# Patient Record
Sex: FEMALE | Race: WHITE | NOT HISPANIC OR LATINO | Employment: FULL TIME | URBAN - NONMETROPOLITAN AREA
[De-identification: names, ages, dates, MRNs, and addresses within clinical notes are randomized per-mention and may not be internally consistent; named-entity substitution may affect disease eponyms.]

---

## 2022-10-05 ENCOUNTER — HOSPITAL ENCOUNTER (EMERGENCY)
Facility: HOSPITAL | Age: 37
Discharge: HOME | End: 2022-10-05
Attending: EMERGENCY MEDICINE
Payer: COMMERCIAL

## 2022-10-05 VITALS
BODY MASS INDEX: 34.73 KG/M2 | DIASTOLIC BLOOD PRESSURE: 78 MMHG | RESPIRATION RATE: 19 BRPM | HEIGHT: 63 IN | OXYGEN SATURATION: 95 % | WEIGHT: 196 LBS | HEART RATE: 112 BPM | TEMPERATURE: 97.6 F | SYSTOLIC BLOOD PRESSURE: 115 MMHG

## 2022-10-05 DIAGNOSIS — L50.9 URTICARIA: Primary | ICD-10-CM

## 2022-10-05 LAB
ALBUMIN SERPL BCP-MCNC: 3.3 G/DL (ref 3.5–5)
ALP SERPL-CCNC: 73 U/L (ref 46–116)
ALT SERPL W P-5'-P-CCNC: 24 U/L (ref 12–78)
ANION GAP SERPL CALCULATED.3IONS-SCNC: 10 MMOL/L (ref 4–13)
AST SERPL W P-5'-P-CCNC: 45 U/L (ref 5–45)
BASOPHILS # BLD AUTO: 0.01 THOUSANDS/ΜL (ref 0–0.1)
BASOPHILS NFR BLD AUTO: 0 % (ref 0–1)
BILIRUB SERPL-MCNC: 0.59 MG/DL (ref 0.2–1)
BUN SERPL-MCNC: 20 MG/DL (ref 5–25)
CALCIUM ALBUM COR SERPL-MCNC: 9.1 MG/DL (ref 8.3–10.1)
CALCIUM SERPL-MCNC: 8.5 MG/DL (ref 8.3–10.1)
CHLORIDE SERPL-SCNC: 101 MMOL/L (ref 96–108)
CO2 SERPL-SCNC: 21 MMOL/L (ref 21–32)
CREAT SERPL-MCNC: 0.75 MG/DL (ref 0.6–1.3)
EOSINOPHIL # BLD AUTO: 0.09 THOUSAND/ΜL (ref 0–0.61)
EOSINOPHIL NFR BLD AUTO: 1 % (ref 0–6)
ERYTHROCYTE [DISTWIDTH] IN BLOOD BY AUTOMATED COUNT: 11.7 % (ref 11.6–15.1)
GFR SERPL CREATININE-BSD FRML MDRD: 102 ML/MIN/1.73SQ M
GLUCOSE SERPL-MCNC: 107 MG/DL (ref 65–140)
HCT VFR BLD AUTO: 37.1 % (ref 34.8–46.1)
HGB BLD-MCNC: 12.9 G/DL (ref 11.5–15.4)
IMM GRANULOCYTES # BLD AUTO: 0.03 THOUSAND/UL (ref 0–0.2)
IMM GRANULOCYTES NFR BLD AUTO: 0 % (ref 0–2)
LYMPHOCYTES # BLD AUTO: 1.91 THOUSANDS/ΜL (ref 0.6–4.47)
LYMPHOCYTES NFR BLD AUTO: 25 % (ref 14–44)
MCH RBC QN AUTO: 32.3 PG (ref 26.8–34.3)
MCHC RBC AUTO-ENTMCNC: 34.8 G/DL (ref 31.4–37.4)
MCV RBC AUTO: 93 FL (ref 82–98)
MONOCYTES # BLD AUTO: 0.14 THOUSAND/ΜL (ref 0.17–1.22)
MONOCYTES NFR BLD AUTO: 2 % (ref 4–12)
NEUTROPHILS # BLD AUTO: 5.56 THOUSANDS/ΜL (ref 1.85–7.62)
NEUTS SEG NFR BLD AUTO: 72 % (ref 43–75)
NRBC BLD AUTO-RTO: 0 /100 WBCS
PLATELET # BLD AUTO: 305 THOUSANDS/UL (ref 149–390)
PMV BLD AUTO: 10.3 FL (ref 8.9–12.7)
POTASSIUM SERPL-SCNC: 4.9 MMOL/L (ref 3.5–5.3)
PROT SERPL-MCNC: 7.4 G/DL (ref 6.4–8.4)
RBC # BLD AUTO: 3.99 MILLION/UL (ref 3.81–5.12)
SODIUM SERPL-SCNC: 132 MMOL/L (ref 135–147)
WBC # BLD AUTO: 7.74 THOUSAND/UL (ref 4.31–10.16)

## 2022-10-05 PROCEDURE — 80053 COMPREHEN METABOLIC PANEL: CPT | Performed by: EMERGENCY MEDICINE

## 2022-10-05 PROCEDURE — 36415 COLL VENOUS BLD VENIPUNCTURE: CPT | Performed by: EMERGENCY MEDICINE

## 2022-10-05 PROCEDURE — 85025 COMPLETE CBC W/AUTO DIFF WBC: CPT | Performed by: EMERGENCY MEDICINE

## 2022-10-05 PROCEDURE — 96374 THER/PROPH/DIAG INJ IV PUSH: CPT

## 2022-10-05 PROCEDURE — 96375 TX/PRO/DX INJ NEW DRUG ADDON: CPT

## 2022-10-05 PROCEDURE — 99284 EMERGENCY DEPT VISIT MOD MDM: CPT | Performed by: EMERGENCY MEDICINE

## 2022-10-05 PROCEDURE — 99283 EMERGENCY DEPT VISIT LOW MDM: CPT

## 2022-10-05 RX ORDER — DULOXETIN HYDROCHLORIDE 60 MG/1
CAPSULE, DELAYED RELEASE ORAL
COMMUNITY

## 2022-10-05 RX ORDER — DIPHENHYDRAMINE HYDROCHLORIDE 50 MG/ML
25 INJECTION INTRAMUSCULAR; INTRAVENOUS ONCE
Status: COMPLETED | OUTPATIENT
Start: 2022-10-05 | End: 2022-10-05

## 2022-10-05 RX ORDER — OMEGA-3S/DHA/EPA/FISH OIL/D3 300MG-1000
1 CAPSULE ORAL DAILY
COMMUNITY

## 2022-10-05 RX ORDER — METHYLPREDNISOLONE SODIUM SUCCINATE 125 MG/2ML
125 INJECTION, POWDER, LYOPHILIZED, FOR SOLUTION INTRAMUSCULAR; INTRAVENOUS ONCE
Status: COMPLETED | OUTPATIENT
Start: 2022-10-05 | End: 2022-10-05

## 2022-10-05 RX ORDER — PREDNISONE 20 MG/1
20 TABLET ORAL DAILY
Qty: 5 TABLET | Refills: 0 | Status: SHIPPED | OUTPATIENT
Start: 2022-10-06

## 2022-10-05 RX ORDER — TOPIRAMATE 25 MG/1
TABLET ORAL
COMMUNITY

## 2022-10-05 RX ORDER — CYCLOBENZAPRINE HCL 10 MG
TABLET ORAL
COMMUNITY

## 2022-10-05 RX ORDER — ARIPIPRAZOLE 15 MG/1
15 TABLET ORAL DAILY
COMMUNITY

## 2022-10-05 RX ORDER — BUPRENORPHINE AND NALOXONE 8; 2 MG/1; MG/1
FILM, SOLUBLE BUCCAL; SUBLINGUAL
COMMUNITY

## 2022-10-05 RX ADMIN — METHYLPREDNISOLONE SODIUM SUCCINATE 125 MG: 125 INJECTION, POWDER, FOR SOLUTION INTRAMUSCULAR; INTRAVENOUS at 12:59

## 2022-10-05 RX ADMIN — DIPHENHYDRAMINE HYDROCHLORIDE 25 MG: 50 INJECTION, SOLUTION INTRAMUSCULAR; INTRAVENOUS at 12:59

## 2022-10-05 NOTE — ED PROVIDER NOTES
History  Chief Complaint   Patient presents with    Rash     Pt reports generalized rash for the past 2 days, started taken a few new medications and ate pumpkin bread prior to getting rash so is unsure what exactly it is from  Seen at [facility] PCP this morning, sent to ED for eval  Denies taking any benadryl  Patient is a 49-year-old female sent to the emergency room by PCP for evaluation of bilateral hand swelling and urticarial rash of her upper arms as well as swelling of her feet  Patient is also have swelling of her lips  This been ongoing for the last 2 days  Patient has been on Bactrim for UTI as well as having eaten some type of pumpkin cake prior to the onset of symptomatology  She was also using an over-the-counter herbal remedy  The patient tried some Benadryl at home without significant relief  She did deny any difficulty swelling  She has no sensation of her throat closing  She has no chest tightness or shortness of breath  She denies any nausea vomiting  No abdominal pain or weakness  Medication list was reviewed and she denies any new medications are adjustment in them  Patient also denies any other allergens other than the ones mentioned above  Patient does report that her hands and feet do feel somewhat tender secondary to the swelling    Patient has no blistering  She has no compromise of the integrity of her mucous membranes  There is no conjunctival erythema or discharge        History provided by:  Patient  Rash  Location:  Face, hand and foot  Facial rash location:  Lower lip and upper lip  Hand rash location:  L hand and R hand  Foot rash location:  L foot and R foot  Quality: itchiness and swelling    Quality: not blistering, not bruising, not burning, not draining, not dry, not painful, not peeling, not red, not scaling and not weeping    Severity:  Moderate  Onset quality:  Gradual  Timing:  Constant  Progression:  Waxing and waning  Context: medications    Context: not animal contact, not chemical exposure, not exposure to similar rash, not food and not plant contact    Relieved by:  Nothing  Worsened by:  Nothing  Ineffective treatments:  Antihistamines  Associated symptoms: myalgias    Associated symptoms: no abdominal pain, no diarrhea, no fatigue, no fever, no headaches, no hoarse voice, no joint pain, no nausea, no periorbital edema, no shortness of breath, no sore throat, no throat swelling, no tongue swelling, not vomiting and not wheezing        Prior to Admission Medications   Prescriptions Last Dose Informant Patient Reported? Taking? ARIPiprazole (ABILIFY) 15 mg tablet   Yes Yes   Sig: Take 15 mg by mouth daily   DULoxetine (CYMBALTA) 60 mg delayed release capsule   Yes Yes   Sig: Take by mouth   buprenorphine-naloxone (Suboxone) 8-2 mg   Yes Yes   Sig: Take by mouth   cholecalciferol (VITAMIN D3) 400 units tablet   Yes Yes   Sig: Take 1 tablet by mouth daily   cyclobenzaprine (FLEXERIL) 10 mg tablet   Yes Yes   Sig: Take by mouth   topiramate (TOPAMAX) 25 mg tablet   Yes Yes   Sig: Take by mouth      Facility-Administered Medications: None       History reviewed  No pertinent past medical history  Past Surgical History:   Procedure Laterality Date    APPENDECTOMY       SECTION         History reviewed  No pertinent family history  I have reviewed and agree with the history as documented  E-Cigarette/Vaping    E-Cigarette Use Current Every Day User      E-Cigarette/Vaping Substances    Nicotine Yes      Social History     Tobacco Use    Smoking status: Never Smoker    Smokeless tobacco: Never Used   Vaping Use    Vaping Use: Every day    Substances: Nicotine   Substance Use Topics    Alcohol use: Not Currently    Drug use: Not Currently       Review of Systems   Constitutional: Negative  Negative for activity change, fatigue and fever  HENT: Negative    Negative for congestion, drooling, ear discharge, ear pain, hoarse voice, rhinorrhea, sinus pressure, sore throat, tinnitus and trouble swallowing  Eyes: Negative  Negative for photophobia, pain, discharge and itching  Respiratory: Negative  Negative for cough, chest tightness, shortness of breath, wheezing and stridor  Cardiovascular: Negative  Negative for chest pain, palpitations and leg swelling  Gastrointestinal: Negative  Negative for abdominal pain, diarrhea, nausea and vomiting  Endocrine: Negative  Genitourinary: Negative  Musculoskeletal: Positive for myalgias  Negative for arthralgias and back pain  Skin: Positive for rash  Allergic/Immunologic: Negative  Neurological: Negative for dizziness, weakness, light-headedness and headaches  Hematological: Negative  Psychiatric/Behavioral: Negative  All other systems reviewed and are negative  Physical Exam  Physical Exam  Vitals and nursing note reviewed  Constitutional:       General: She is awake  Appearance: Normal appearance  She is well-developed  She is not ill-appearing or toxic-appearing  HENT:      Head: Normocephalic and atraumatic  Right Ear: External ear normal       Left Ear: External ear normal       Nose: Nose normal       Mouth/Throat:      Mouth: Mucous membranes are dry  No injury  Comments: Swelling of both lips  Eyes:      General: Lids are normal  No scleral icterus  Extraocular Movements: Extraocular movements intact  Pupils: Pupils are equal, round, and reactive to light  Cardiovascular:      Rate and Rhythm: Normal rate and regular rhythm  Heart sounds: Normal heart sounds  No murmur heard  Pulmonary:      Effort: Pulmonary effort is normal  No respiratory distress  Breath sounds: Normal breath sounds  No wheezing, rhonchi or rales  Abdominal:      General: Abdomen is flat  There is no distension  Palpations: Abdomen is soft  Tenderness: There is no abdominal tenderness  There is no guarding or rebound     Musculoskeletal: General: No swelling, tenderness or deformity  Normal range of motion  Cervical back: Normal range of motion and neck supple  Skin:     General: Skin is warm and dry  Coloration: Skin is not jaundiced or pale  Findings: Rash (Urticarial rash) present  Neurological:      Mental Status: She is alert and oriented to person, place, and time  Mental status is at baseline  Cranial Nerves: No cranial nerve deficit  Motor: No weakness     Psychiatric:         Attention and Perception: Attention normal          Mood and Affect: Mood normal          Speech: Speech normal          Behavior: Behavior normal          Vital Signs  ED Triage Vitals [10/05/22 1128]   Temperature Pulse Respirations Blood Pressure SpO2   97 6 °F (36 4 °C) (!) 112 19 115/78 95 %      Temp Source Heart Rate Source Patient Position - Orthostatic VS BP Location FiO2 (%)   Temporal Monitor Sitting Right arm --      Pain Score       6           Vitals:    10/05/22 1128   BP: 115/78   Pulse: (!) 112   Patient Position - Orthostatic VS: Sitting         Visual Acuity      ED Medications  Medications   diphenhydrAMINE (BENADRYL) injection 25 mg (25 mg Intravenous Given 10/5/22 1259)   methylPREDNISolone sodium succinate (Solu-MEDROL) injection 125 mg (125 mg Intravenous Given 10/5/22 1259)       Diagnostic Studies  Results Reviewed     Procedure Component Value Units Date/Time    Comprehensive metabolic panel [[id number]]  (Abnormal) Collected: 10/05/22 1259    Lab Status: Final result Specimen: Blood from Arm, Right Updated: 10/05/22 1325     Sodium 132 mmol/L      Potassium 4 9 mmol/L      Chloride 101 mmol/L      CO2 21 mmol/L      ANION GAP 10 mmol/L      BUN 20 mg/dL      Creatinine 0 75 mg/dL      Glucose 107 mg/dL      Calcium 8 5 mg/dL      Corrected Calcium 9 1 mg/dL      AST 45 U/L      ALT 24 U/L      Alkaline Phosphatase 73 U/L      Total Protein 7 4 g/dL      Albumin 3 3 g/dL      Total Bilirubin 0 59 mg/dL      eGFR 102 ml/min/1 73sq m     Narrative:      National Kidney Disease Foundation guidelines for Chronic Kidney Disease (CKD):     Stage 1 with normal or high GFR (GFR > 90 mL/min/1 73 square meters)    Stage 2 Mild CKD (GFR = 60-89 mL/min/1 73 square meters)    Stage 3A Moderate CKD (GFR = 45-59 mL/min/1 73 square meters)    Stage 3B Moderate CKD (GFR = 30-44 mL/min/1 73 square meters)    Stage 4 Severe CKD (GFR = 15-29 mL/min/1 73 square meters)    Stage 5 End Stage CKD (GFR <15 mL/min/1 73 square meters)  Note: GFR calculation is accurate only with a steady state creatinine    CBC and differential [[id number]]  (Abnormal) Collected: 10/05/22 1259    Lab Status: Final result Specimen: Blood from Arm, Right Updated: 10/05/22 1306     WBC 7 74 Thousand/uL      RBC 3 99 Million/uL      Hemoglobin 12 9 g/dL      Hematocrit 37 1 %      MCV 93 fL      MCH 32 3 pg      MCHC 34 8 g/dL      RDW 11 7 %      MPV 10 3 fL      Platelets 746 Thousands/uL      nRBC 0 /100 WBCs      Neutrophils Relative 72 %      Immat GRANS % 0 %      Lymphocytes Relative 25 %      Monocytes Relative 2 %      Eosinophils Relative 1 %      Basophils Relative 0 %      Neutrophils Absolute 5 56 Thousands/µL      Immature Grans Absolute 0 03 Thousand/uL      Lymphocytes Absolute 1 91 Thousands/µL      Monocytes Absolute 0 14 Thousand/µL      Eosinophils Absolute 0 09 Thousand/µL      Basophils Absolute 0 01 Thousands/µL                  No orders to display              Procedures  Procedures         ED Course  ED Course as of 10/05/22 1628   Wed Oct 05, 2022   1358 No acute findings on patient's laboratory studies   1426 Feeling better  Stable for discharge  Stop all new medications  Avoid pumpkin  MDM  Number of Diagnoses or Management Options  Urticaria: new and requires workup  Diagnosis management comments: Onset of rash possibly secondary to recent antibiotics verses food substances    Advised patient to discontinue the antibiotics as she was no longer having any symptomatology and she has been treated for at least 4 to 5 days  Also advised her to discontinue the food 7 says that she has been previously exposed to  She was started on prednisone and advised to use Benadryl  Amount and/or Complexity of Data Reviewed  Clinical lab tests: ordered and reviewed    Risk of Complications, Morbidity, and/or Mortality  Presenting problems: moderate  Diagnostic procedures: moderate  Management options: moderate    Patient Progress  Patient progress: improved      Disposition  Final diagnoses:   Urticaria     Time reflects when diagnosis was documented in both MDM as applicable and the Disposition within this note     Time User Action Codes Description Comment    10/5/2022  2:27 PM Vinnie Krause Add [L50 9] Urticaria       ED Disposition     ED Disposition   Discharge    Condition   Stable    Date/Time   Wed Oct 5, 2022  2:27 PM    [address] discharge to home/self care                 Follow-up Information     Follow up With Specialties Details Why Lian Ibarra Nurse Practitioner   [address] [place] [address]  [phone]            Discharge Medication List as of 10/5/2022  2:29 PM      START taking these medications    Details   predniSONE 20 mg tablet Take 1 tablet (20 mg total) by mouth daily Do not start before October 6, 2022 , Starting Thu 10/6/2022, Normal         CONTINUE these medications which have NOT CHANGED    Details   ARIPiprazole (ABILIFY) 15 mg tablet Take 15 mg by mouth daily, Historical Med      buprenorphine-naloxone (Suboxone) 8-2 mg Take by mouth, Historical Med      cholecalciferol (VITAMIN D3) 400 units tablet Take 1 tablet by mouth daily, Historical Med      cyclobenzaprine (FLEXERIL) 10 mg tablet Take by mouth, Historical Med      DULoxetine (CYMBALTA) 60 mg delayed release capsule Take by mouth, Historical Med      topiramate (TOPAMAX) 25 mg tablet Take by mouth, Historical Med             No discharge procedures on file      PDMP Review     None          ED Provider  Electronically Signed by           Rey Moreno DO  10/05/22 6368

## 2022-10-05 NOTE — DISCHARGE INSTRUCTIONS
Take medication as prescribed  You may also use diphenhydramine over-the-counter as needed for itch  Return with any worsening  Thank you for choosing the emergency department at [facility]  We appreciated the opportunity and privilege to address your healthcare needs  We remain available to you should you require additional evaluation or assistance  We value your feedback and would appreciate the opportunity to address anything you identified as an opportunity to improve or where we excelled  If there are colleagues who deserve special recognition, please let us know! We hope you are feeling better soon! Please also note that sometimes there are subtle abnormalities in your lab values that you may observe when you access your record online  These are frequently not worrisome and if they are of concern we will have discussed them with you  However, we always encourage that you discuss any concerns you may have or observe on your record with your primary care provider  Please also be aware that voice transcription will occasionally recognize words or grammar differently than what was spoken

## 2022-10-05 NOTE — Clinical Note
Charley Phillips was seen and treated in our emergency department on 10/5/2022  Diagnosis:     Marni Koehler  may return to work on return date  She may return on this date: 10/06/2022    No work 10/4 and 10/5     If you have any questions or concerns, please don't hesitate to call        Ramya Watson, DO    ______________________________           _______________          _______________  Hospital Representative                              Date                                Time

## 2023-12-05 ENCOUNTER — OFFICE VISIT (OUTPATIENT)
Dept: URGENT CARE | Facility: CLINIC | Age: 38
End: 2023-12-05
Payer: COMMERCIAL

## 2023-12-05 VITALS
WEIGHT: 180 LBS | RESPIRATION RATE: 18 BRPM | HEIGHT: 63 IN | OXYGEN SATURATION: 96 % | HEART RATE: 104 BPM | TEMPERATURE: 98.7 F | BODY MASS INDEX: 31.89 KG/M2 | DIASTOLIC BLOOD PRESSURE: 78 MMHG | SYSTOLIC BLOOD PRESSURE: 112 MMHG

## 2023-12-05 DIAGNOSIS — M54.50 ACUTE MIDLINE LOW BACK PAIN WITHOUT SCIATICA: Primary | ICD-10-CM

## 2023-12-05 PROCEDURE — 99213 OFFICE O/P EST LOW 20 MIN: CPT

## 2023-12-05 PROCEDURE — 96372 THER/PROPH/DIAG INJ SC/IM: CPT

## 2023-12-05 PROCEDURE — S9088 SERVICES PROVIDED IN URGENT: HCPCS

## 2023-12-05 RX ORDER — DULOXETIN HYDROCHLORIDE 30 MG/1
30 CAPSULE, DELAYED RELEASE ORAL DAILY
COMMUNITY

## 2023-12-05 RX ORDER — KETOROLAC TROMETHAMINE 30 MG/ML
30 INJECTION, SOLUTION INTRAMUSCULAR; INTRAVENOUS ONCE
Status: COMPLETED | OUTPATIENT
Start: 2023-12-05 | End: 2023-12-05

## 2023-12-05 RX ORDER — DULOXETIN HYDROCHLORIDE 60 MG/1
120 CAPSULE, DELAYED RELEASE ORAL DAILY
COMMUNITY

## 2023-12-05 RX ADMIN — KETOROLAC TROMETHAMINE 30 MG: 30 INJECTION, SOLUTION INTRAMUSCULAR; INTRAVENOUS at 13:54

## 2023-12-05 NOTE — PATIENT INSTRUCTIONS
Continue Tylenol and Ibuprofen as needed. Do not exceed 1000 mg of Tylenol and 800 mg of ibuprofen. Heat/ice as needed  Light stretching  Rest as tolerated  Follow up with PCP in 3-5 days. Proceed to  ER if symptoms worsen.

## 2023-12-05 NOTE — PROGRESS NOTES
[facility]        NAME: Indu Johnson is a 45 y.o. female  : 1985    MRN: [id number]  DATE: 2023  TIME: 4:36 PM    Assessment and Plan   Acute midline low back pain without sciatica [M54.50]  1. Acute midline low back pain without sciatica  ketorolac (TORADOL) injection 30 mg            Patient Instructions     Continue Tylenol and Ibuprofen as needed. Do not exceed 1000 mg of Tylenol and 800 mg of ibuprofen. Heat/ice as needed  Light stretching  Rest as tolerated  Follow up with PCP in 3-5 days. Proceed to  ER if symptoms worsen. Chief Complaint     Chief Complaint   Patient presents with    Back Pain     Lower and mid back pain. Started yesterday. Bent over, felt something stretch. Difficulty walking         History of Present Illness       Back Pain  This is a new problem. The current episode started yesterday. The pain is present in the lumbar spine (midline). The pain does not radiate. Exacerbated by: bending and trying to stand/sit. Pertinent negatives include no bladder incontinence, bowel incontinence, numbness, tingling or weakness. Treatments tried: ibuprofen, tylenol. Started after bending over and felt something stretch. She is having difficulty walking now. Review of Systems   Review of Systems   Constitutional: Negative. Respiratory: Negative. Cardiovascular: Negative. Gastrointestinal:  Negative for bowel incontinence. Genitourinary:  Negative for bladder incontinence. Musculoskeletal:  Positive for back pain and gait problem (due to pain). Skin: Negative. Neurological:  Negative for dizziness, tingling, tremors, syncope, weakness, light-headedness and numbness.          Current Medications       Current Outpatient Medications:     buprenorphine-naloxone (Suboxone) 8-2 mg, Take by mouth, Disp: , Rfl:     Cholecalciferol 1.25 MG (54685 UT) TABS, Take by mouth, Disp: , Rfl:     DULoxetine (CYMBALTA) 30 mg delayed release capsule, Take 30 mg by mouth daily, Disp: , Rfl:     DULoxetine (CYMBALTA) 60 mg delayed release capsule, Take by mouth, Disp: , Rfl:     DULoxetine (CYMBALTA) 60 mg delayed release capsule, Take 120 mg by mouth daily, Disp: , Rfl:     ARIPiprazole (ABILIFY) 15 mg tablet, Take 15 mg by mouth daily, Disp: , Rfl:     cholecalciferol (VITAMIN D3) 400 units tablet, Take 1 tablet by mouth daily, Disp: , Rfl:     cyclobenzaprine (FLEXERIL) 10 mg tablet, Take by mouth, Disp: , Rfl:     predniSONE 20 mg tablet, Take 1 tablet (20 mg total) by mouth daily Do not start before 2022., Disp: 5 tablet, Rfl: 0    topiramate (TOPAMAX) 25 mg tablet, Take by mouth, Disp: , Rfl:   No current facility-administered medications for this visit. Current Allergies     Allergies as of 2023 - Reviewed 2023   Allergen Reaction Noted    Bactrim [sulfamethoxazole-trimethoprim] Swelling 2023            The following portions of the patient's history were reviewed and updated as appropriate: allergies, current medications, past family history, past medical history, past social history, past surgical history and problem list.     Past Medical History:   Diagnosis Date    Anxiety     Arthritis     Depression     Fibromyalgia     Opiate addiction ([address])        Past Surgical History:   Procedure Laterality Date    APPENDECTOMY       SECTION         Family History   Problem Relation Age of Onset    Diabetes Mother     Parkinsonism Mother     Heart disease Father     Arthritis Father          Medications have been verified. Objective   /78   Pulse 104   Temp 98.7 °F (37.1 °C)   Resp 18   Ht 5' 3" (1.6 m)   Wt 81.6 kg (180 lb)   SpO2 96%   BMI 31.89 kg/m²        Physical Exam     Physical Exam  Constitutional:       Appearance: Normal appearance. Eyes:      Extraocular Movements: Extraocular movements intact. Pupils: Pupils are equal, round, and reactive to light.    Cardiovascular:      Rate and Rhythm: Normal rate and regular rhythm. Pulses: Normal pulses. Heart sounds: Normal heart sounds. Pulmonary:      Effort: Pulmonary effort is normal.      Breath sounds: Normal breath sounds. Musculoskeletal:         General: No swelling, tenderness, deformity or signs of injury. Comments: Limited back ROM due to pain   Skin:     General: Skin is warm and dry. Neurological:      General: No focal deficit present. Mental Status: She is alert and oriented to person, place, and time. Mental status is at baseline. Cranial Nerves: No cranial nerve deficit.

## 2023-12-05 NOTE — LETTER
December 5, 2023     Patient: João Kennedy   YOB: 1985   Date of Visit: 12/5/2023       To Whom It May Concern: It is my medical opinion that Vito Li may return to work on 12/7/2023 . If you have any questions or concerns, please don't hesitate to call.          Sincerely,        Marcelina Maldonado PA-C    CC: No Recipients

## 2023-12-18 ENCOUNTER — OFFICE VISIT (OUTPATIENT)
Dept: URGENT CARE | Facility: CLINIC | Age: 38
End: 2023-12-18
Payer: COMMERCIAL

## 2023-12-18 VITALS
DIASTOLIC BLOOD PRESSURE: 72 MMHG | WEIGHT: 180 LBS | BODY MASS INDEX: 31.89 KG/M2 | HEART RATE: 100 BPM | OXYGEN SATURATION: 99 % | TEMPERATURE: 97.7 F | SYSTOLIC BLOOD PRESSURE: 128 MMHG | RESPIRATION RATE: 16 BRPM | HEIGHT: 63 IN

## 2023-12-18 DIAGNOSIS — L01.00 IMPETIGO: Primary | ICD-10-CM

## 2023-12-18 PROCEDURE — S9088 SERVICES PROVIDED IN URGENT: HCPCS

## 2023-12-18 PROCEDURE — 99213 OFFICE O/P EST LOW 20 MIN: CPT

## 2023-12-18 NOTE — PATIENT INSTRUCTIONS
Apply cream as prescribed  Keep clean and dry  Follow up with PCP in 3-5 days.  Proceed to  ER if symptoms worsen.

## 2023-12-18 NOTE — PROGRESS NOTES
"  [facility]        NAME: Danika Birmingham is a 38 y.o. female  : 1985    MRN: [id number]  DATE: 2023  TIME: 7:44 PM    Assessment and Plan   Impetigo [L01.00]  1. Impetigo  mupirocin (BACTROBAN) 2 % ointment            Patient Instructions     Apply cream as prescribed  Keep clean and dry  Follow up with PCP in 3-5 days.  Proceed to  ER if symptoms worsen.    Chief Complaint     Chief Complaint   Patient presents with    Rash     Getting white \"sheen\" like eruptions on face that sheds a \"paper' like drainage at times. Areas are extremely itchy. Also has some on back. Fingertips are \"peeling\" and skin splitting         History of Present Illness       Patient is presenting for rash on her face times a couple of days.  She stated it is all over her body especially her face and that her back.  She stated that she gets a white \"sheen\" over her face.  She stated that white dry stuff comes out of the areas on her face.  She stated she also feels like her fingertips peel.  She has tried rubbing alcohol, fungal cream, antibacterial cream, and tea tree oil.  She is never seen a dermatologist.    Rash  This is a new problem. Pertinent negatives include no fatigue, fever or shortness of breath.       Review of Systems   Review of Systems   Constitutional:  Negative for chills, diaphoresis, fatigue and fever.   HENT: Negative.  Negative for facial swelling and trouble swallowing.    Eyes: Negative.  Negative for visual disturbance.   Respiratory: Negative.  Negative for shortness of breath.    Cardiovascular: Negative.    Skin:  Positive for rash (face and back).   Neurological: Negative.          Current Medications       Current Outpatient Medications:     buprenorphine-naloxone (Suboxone) 8-2 mg, Take by mouth, Disp: , Rfl:     Cholecalciferol 1.25 MG (08314 UT) TABS, Take by mouth, Disp: , Rfl:     DULoxetine (CYMBALTA) 30 mg delayed release capsule, Take 30 mg by mouth daily, Disp: , Rfl:     " "DULoxetine (CYMBALTA) 60 mg delayed release capsule, Take 120 mg by mouth daily, Disp: , Rfl:     mupirocin (BACTROBAN) 2 % ointment, Apply topically 3 (three) times a day, Disp: 30 g, Rfl: 0    ARIPiprazole (ABILIFY) 15 mg tablet, Take 15 mg by mouth daily, Disp: , Rfl:     cholecalciferol (VITAMIN D3) 400 units tablet, Take 1 tablet by mouth daily, Disp: , Rfl:     cyclobenzaprine (FLEXERIL) 10 mg tablet, Take by mouth, Disp: , Rfl:     DULoxetine (CYMBALTA) 60 mg delayed release capsule, Take by mouth, Disp: , Rfl:     predniSONE 20 mg tablet, Take 1 tablet (20 mg total) by mouth daily Do not start before 2022., Disp: 5 tablet, Rfl: 0    topiramate (TOPAMAX) 25 mg tablet, Take by mouth, Disp: , Rfl:     Current Allergies     Allergies as of 2023 - Reviewed 2023   Allergen Reaction Noted    Bactrim [sulfamethoxazole-trimethoprim] Swelling 2023            The following portions of the patient's history were reviewed and updated as appropriate: allergies, current medications, past family history, past medical history, past social history, past surgical history and problem list.     Past Medical History:   Diagnosis Date    Anxiety     Arthritis     Depression     Fibromyalgia     Opiate addiction (HCC)     in recovery for 7+ years       Past Surgical History:   Procedure Laterality Date    APPENDECTOMY       SECTION         Family History   Problem Relation Age of Onset    Diabetes Mother     Parkinsonism Mother     Heart disease Father     Arthritis Father          Medications have been verified.        Objective   /72   Pulse 100   Temp 97.7 °F (36.5 °C)   Resp 16   Ht 5' 3\" (1.6 m)   Wt 81.6 kg (180 lb)   LMP 2023   SpO2 99%   BMI 31.89 kg/m²        Physical Exam     Physical Exam  Constitutional:       Appearance: Normal appearance.   HENT:      Nose: Nose normal.      Mouth/Throat:      Mouth: Mucous membranes are moist.      Pharynx: Oropharynx is clear. "   Eyes:      Extraocular Movements: Extraocular movements intact.      Pupils: Pupils are equal, round, and reactive to light.   Cardiovascular:      Rate and Rhythm: Normal rate and regular rhythm.      Pulses: Normal pulses.      Heart sounds: Normal heart sounds.   Pulmonary:      Effort: Pulmonary effort is normal.      Breath sounds: Normal breath sounds.   Musculoskeletal:         General: Normal range of motion.   Skin:     General: Skin is warm and dry.      Findings: Rash present.   Neurological:      General: No focal deficit present.      Mental Status: She is alert and oriented to person, place, and time. Mental status is at baseline.

## 2024-04-19 ENCOUNTER — OFFICE VISIT (OUTPATIENT)
Dept: URGENT CARE | Facility: CLINIC | Age: 39
End: 2024-04-19
Payer: COMMERCIAL

## 2024-04-19 VITALS
DIASTOLIC BLOOD PRESSURE: 82 MMHG | SYSTOLIC BLOOD PRESSURE: 122 MMHG | HEART RATE: 88 BPM | BODY MASS INDEX: 37.42 KG/M2 | RESPIRATION RATE: 16 BRPM | HEIGHT: 63 IN | TEMPERATURE: 97.9 F | WEIGHT: 211.2 LBS | OXYGEN SATURATION: 97 %

## 2024-04-19 DIAGNOSIS — M54.50 ACUTE BILATERAL LOW BACK PAIN WITHOUT SCIATICA: Primary | ICD-10-CM

## 2024-04-19 PROCEDURE — 99213 OFFICE O/P EST LOW 20 MIN: CPT

## 2024-04-19 RX ORDER — CYCLOBENZAPRINE HCL 10 MG
10 TABLET ORAL 3 TIMES DAILY PRN
Qty: 12 TABLET | Refills: 0 | Status: SHIPPED | OUTPATIENT
Start: 2024-04-19

## 2024-04-19 RX ORDER — HYDROXYZINE HYDROCHLORIDE 25 MG/1
25 TABLET, FILM COATED ORAL EVERY 6 HOURS PRN
COMMUNITY

## 2024-04-19 NOTE — PROGRESS NOTES
[facility]        NAME: Danika Birmingham is a 38 y.o. female  : 1985    MRN: [id number]  DATE: 2024  TIME: 4:12 PM    Assessment and Plan   Acute bilateral low back pain without sciatica [M54.50]  1. Acute bilateral low back pain without sciatica  cyclobenzaprine (FLEXERIL) 10 mg tablet    Ambulatory referral to Spine & Pain Management        Acute on chronic back pain. Just got new insurance so looking for spine and pain referral which was placed. Continue tylenol and motrin, will prescribe short course of flexeril for this issue, discussed side effects. Also use ice and heat    Patient Instructions       Follow up with PCP in 3-5 days.  Proceed to  ER if symptoms worsen.    If tests are performed, our office will contact you with results only if changes need to made to the care plan discussed with you at the visit. [facility]    Chief Complaint     Chief Complaint   Patient presents with   • Back Pain     C/o lower back pain. Pt works in [place] and does a lot of lifting but does not recall an injury         History of Present Illness       C/o lower back pain. Pt works in [place] and does a lot of lifting but does not recall an injury. Very tight, burning sensation. No prior surgeries to the back.  Denies any numbness, weakness, tingling. No fevers, no IV drug use, no bilateral lower extremity weakness, urinary or fecal incontinence, no saddle anesthesia, no regular steroid use, no trauma. 4/10 at rest, gets has high as 8/10. 4 ibuprofen, 2 tylenol every 8 hours-helps but needs to take consistently.     Back Pain  Pertinent negatives include no chest pain or fever.       Review of Systems   Review of Systems   Constitutional:  Negative for appetite change, chills, fatigue and fever.   Respiratory:  Negative for cough, shortness of breath, wheezing and stridor.    Cardiovascular:  Negative for chest pain and palpitations.  "  Musculoskeletal:  Positive for back pain.         Current Medications       Current Outpatient Medications:   •  buprenorphine-naloxone (Suboxone) 8-2 mg, Take by mouth, Disp: , Rfl:   •  cholecalciferol (VITAMIN D3) 400 units tablet, Take 1 tablet by mouth daily, Disp: , Rfl:   •  Cholecalciferol 1.25 MG (34649 UT) TABS, Take by mouth, Disp: , Rfl:   •  cyclobenzaprine (FLEXERIL) 10 mg tablet, Take 1 tablet (10 mg total) by mouth 3 (three) times a day as needed for muscle spasms, Disp: 12 tablet, Rfl: 0  •  DULoxetine (CYMBALTA) 30 mg delayed release capsule, Take 30 mg by mouth daily, Disp: , Rfl:   •  DULoxetine (CYMBALTA) 60 mg delayed release capsule, Take 60 mg by mouth 2 (two) times a day, Disp: , Rfl:   •  hydrOXYzine HCL (ATARAX) 25 mg tablet, Take 25 mg by mouth every 6 (six) hours as needed for itching, Disp: , Rfl:   •  DULoxetine (CYMBALTA) 60 mg delayed release capsule, Take 120 mg by mouth daily (Patient not taking: Reported on 2024), Disp: , Rfl:     Current Allergies     Allergies as of 2024 - Reviewed 2024   Allergen Reaction Noted   • Bactrim [sulfamethoxazole-trimethoprim] Swelling 2023            The following portions of the patient's history were reviewed and updated as appropriate: allergies, current medications, past family history, past medical history, past social history, past surgical history and problem list.     Past Medical History:   Diagnosis Date   • Anxiety    • Arthritis    • Depression    • Fibromyalgia    • Opiate addiction (HCC)     in recovery for 7+ years       Past Surgical History:   Procedure Laterality Date   • APPENDECTOMY     •  SECTION         Family History   Problem Relation Age of Onset   • Diabetes Mother    • Parkinsonism Mother    • Heart disease Father    • Arthritis Father          Medications have been verified.        Objective   /82   Pulse 88   Temp 97.9 °F (36.6 °C)   Resp 16   Ht 5' 3\" (1.6 m)   Wt 95.8 kg (211 " lb 3.2 oz)   LMP 03/22/2024   SpO2 97%   BMI 37.41 kg/m²        Physical Exam     Physical Exam  Vitals and nursing note reviewed.   Constitutional:       General: She is not in acute distress.     Appearance: Normal appearance. She is normal weight. She is not ill-appearing, toxic-appearing or diaphoretic.   Cardiovascular:      Rate and Rhythm: Normal rate and regular rhythm.      Pulses: Normal pulses.      Heart sounds: Normal heart sounds. No murmur heard.     No friction rub. No gallop.   Pulmonary:      Effort: Pulmonary effort is normal. No respiratory distress.      Breath sounds: Normal breath sounds. No stridor. No wheezing, rhonchi or rales.   Chest:      Chest wall: No tenderness.   Musculoskeletal:      Lumbar back: Tenderness present. No swelling, edema, deformity, signs of trauma, lacerations, spasms or bony tenderness. Decreased range of motion. Negative right straight leg raise test and negative left straight leg raise test. No scoliosis.      Comments: 5/5 hip strength, ambulating without difficulty. Paraspinal tenderness without any lifts or step-off deformities. Denies any saddle anesthesia.    Neurological:      Mental Status: She is alert.

## 2024-04-22 ENCOUNTER — TELEPHONE (OUTPATIENT)
Age: 39
End: 2024-04-22

## 2024-04-22 NOTE — TELEPHONE ENCOUNTER
Caller: Patient     Doctor: SANCHEZ     Reason for call: Calling for appointment , patient has [employer] and is not listed on our list was trying to confirm if we are in network and call dropped     Call back#: n/a

## 2024-04-23 NOTE — TELEPHONE ENCOUNTER
Caller: Patient    Doctor: SANCHEZ    Reason for call: Calling back. Relayed information that insurance was not listed on our PAR sheet. Advised pt to reach out to her insurance to see if we are in network. Provided pt with NPI. Pt very appreciative.    Call back#: [phone]

## 2024-04-26 NOTE — TELEPHONE ENCOUNTER
Caller: Danika    Doctor: Angelita    Reason for call: Pt called to schedule an appt with the dr and informed that she did in fact contact her insurance and was told that we are PAR and take her insurance. Scheduled pt it [employer] but goes under [employer].     Please advise if pt is still good to be on schedule.     Call back#: [phone]

## 2024-08-12 ENCOUNTER — APPOINTMENT (OUTPATIENT)
Dept: RADIOLOGY | Facility: CLINIC | Age: 39
End: 2024-08-12
Payer: COMMERCIAL

## 2024-08-12 DIAGNOSIS — S39.012A BACK STRAIN, INITIAL ENCOUNTER: ICD-10-CM

## 2024-08-12 PROCEDURE — 72100 X-RAY EXAM L-S SPINE 2/3 VWS: CPT

## 2024-08-12 PROCEDURE — 72072 X-RAY EXAM THORAC SPINE 3VWS: CPT

## 2024-08-25 ENCOUNTER — APPOINTMENT (OUTPATIENT)
Dept: RADIOLOGY | Facility: CLINIC | Age: 39
End: 2024-08-25
Payer: COMMERCIAL

## 2024-08-25 DIAGNOSIS — G43.719 CHRONIC MIGRAINE WITHOUT AURA, INTRACTABLE, WITHOUT STATUS MIGRAINOSUS: ICD-10-CM

## 2024-08-25 PROCEDURE — 72040 X-RAY EXAM NECK SPINE 2-3 VW: CPT
